# Patient Record
Sex: FEMALE | Race: WHITE | NOT HISPANIC OR LATINO
[De-identification: names, ages, dates, MRNs, and addresses within clinical notes are randomized per-mention and may not be internally consistent; named-entity substitution may affect disease eponyms.]

---

## 2023-01-03 ENCOUNTER — APPOINTMENT (OUTPATIENT)
Dept: ENDOCRINOLOGY | Facility: CLINIC | Age: 33
End: 2023-01-03
Payer: MEDICARE

## 2023-01-03 VITALS
SYSTOLIC BLOOD PRESSURE: 110 MMHG | HEIGHT: 58 IN | BODY MASS INDEX: 30.86 KG/M2 | DIASTOLIC BLOOD PRESSURE: 78 MMHG | WEIGHT: 147 LBS

## 2023-01-03 DIAGNOSIS — Z78.9 OTHER SPECIFIED HEALTH STATUS: ICD-10-CM

## 2023-01-03 DIAGNOSIS — Z82.3 FAMILY HISTORY OF STROKE: ICD-10-CM

## 2023-01-03 DIAGNOSIS — Z82.49 FAMILY HISTORY OF ISCHEMIC HEART DISEASE AND OTHER DISEASES OF THE CIRCULATORY SYSTEM: ICD-10-CM

## 2023-01-03 PROBLEM — Z00.00 ENCOUNTER FOR PREVENTIVE HEALTH EXAMINATION: Status: ACTIVE | Noted: 2023-01-03

## 2023-01-03 PROCEDURE — 99203 OFFICE O/P NEW LOW 30 MIN: CPT

## 2023-01-03 RX ORDER — AMOXICILLIN 500 MG/1
500 TABLET, FILM COATED ORAL
Qty: 21 | Refills: 0 | Status: ACTIVE | COMMUNITY
Start: 2022-09-12

## 2023-01-03 RX ORDER — MONTELUKAST 10 MG/1
10 TABLET, FILM COATED ORAL
Qty: 90 | Refills: 0 | Status: ACTIVE | COMMUNITY
Start: 2022-03-04

## 2023-01-03 RX ORDER — FLUTICASONE PROPIONATE 50 UG/1
50 SPRAY, METERED NASAL
Qty: 48 | Refills: 0 | Status: ACTIVE | COMMUNITY
Start: 2022-09-06

## 2023-01-03 RX ORDER — IVERMECTIN 10 MG/G
1 CREAM TOPICAL
Qty: 45 | Refills: 0 | Status: DISCONTINUED | COMMUNITY
Start: 2022-11-08

## 2023-01-03 RX ORDER — KETOCONAZOLE 20.5 MG/ML
2 SHAMPOO, SUSPENSION TOPICAL
Qty: 120 | Refills: 0 | Status: ACTIVE | COMMUNITY
Start: 2022-11-08

## 2023-01-03 RX ORDER — FAMOTIDINE 20 MG/1
20 TABLET, FILM COATED ORAL
Qty: 180 | Refills: 0 | Status: ACTIVE | COMMUNITY
Start: 2022-04-11

## 2023-01-03 RX ORDER — IBUPROFEN 800 MG/1
800 TABLET, FILM COATED ORAL
Qty: 30 | Refills: 0 | Status: ACTIVE | COMMUNITY
Start: 2022-09-12

## 2023-01-03 RX ORDER — TRIAMCINOLONE ACETONIDE 1 MG/G
0.1 CREAM TOPICAL
Qty: 80 | Refills: 0 | Status: ACTIVE | COMMUNITY
Start: 2022-11-08

## 2023-01-03 RX ORDER — NORGESTIMATE AND ETHINYL ESTRADIOL 0.25-0.035
0.25-35 KIT ORAL
Qty: 84 | Refills: 0 | Status: ACTIVE | COMMUNITY
Start: 2022-06-28

## 2023-01-03 RX ORDER — CLOBETASOL PROPIONATE 0.5 MG/ML
0.05 SOLUTION TOPICAL
Qty: 50 | Refills: 0 | Status: ACTIVE | COMMUNITY
Start: 2022-11-08

## 2023-01-05 LAB
ALBUMIN SERPL ELPH-MCNC: 4.3 G/DL
ALP BLD-CCNC: 158 U/L
ALT SERPL-CCNC: 61 U/L
ANION GAP SERPL CALC-SCNC: 20 MMOL/L
AST SERPL-CCNC: 32 U/L
BILIRUB SERPL-MCNC: 0.2 MG/DL
BUN SERPL-MCNC: 17 MG/DL
CALCIUM SERPL-MCNC: 9.5 MG/DL
CHLORIDE SERPL-SCNC: 101 MMOL/L
CHOLEST SERPL-MCNC: 135 MG/DL
CO2 SERPL-SCNC: 20 MMOL/L
CREAT SERPL-MCNC: 0.73 MG/DL
EGFR: 111 ML/MIN/1.73M2
FSH SERPL-MCNC: 3.8 IU/L
GLUCOSE SERPL-MCNC: 106 MG/DL
HDLC SERPL-MCNC: 36 MG/DL
LDLC SERPL CALC-MCNC: 57 MG/DL
LH SERPL-ACNC: 2.2 IU/L
NONHDLC SERPL-MCNC: 99 MG/DL
POTASSIUM SERPL-SCNC: 4.5 MMOL/L
PROT SERPL-MCNC: 7.6 G/DL
SODIUM SERPL-SCNC: 141 MMOL/L
T3 SERPL-MCNC: 241 NG/DL
T4 FREE SERPL-MCNC: 1.4 NG/DL
THYROPEROXIDASE AB SERPL IA-ACNC: 72.4 IU/ML
TRIGL SERPL-MCNC: 212 MG/DL
TSH SERPL-ACNC: 0.01 UIU/ML

## 2023-01-16 LAB
TSH RECEPTOR AB: 4.08 IU/L
TSI ACT/NOR SER: 4.11 IU/L

## 2023-01-16 NOTE — ASSESSMENT
[FreeTextEntry1] : Patient presents for evaluation of low TSH level and weight gain\par Reviewed outside blood work from October 2022, noted suppressed TSH and normal free T4 level of 1.1, however no thyroid antibodies were obtained at that time.\par Discussed various pathophysiology and etiologies of low TSH level with patient today.\par She is not currently on any thyroid medication at this time\par Clinically euthyroid, however notes weight gain\par Bloodwork was collected in office today, will f/u results accordingly.\par Also advised to obtain thyroid sonogram to evaluate for nodules\par \par Answered all questions today; patient verbalized understanding of the above\par RTC in 3 months\par

## 2023-01-16 NOTE — HISTORY OF PRESENT ILLNESS
[FreeTextEntry1] : DONALD MERINO  is a 32 year old female with past medical history of ANDRZEJ on CPAP, who presents today for management of low TSH level and weight gain\par \par Patient notes she has seen endocrinologist in past, was never on thyroid medication.  She notes she had recent low TSH level upon review of blood work.  She notes she has been struggling with weight gain and has just started seeing a nutritionist again. She gained 20 pounds in past 1.5 years. \par Typical diet:\par Breakfast: no coffee, egg whites, sugar, protein smoothies\par Lunch: healthy meal protein, egg salad, and tuna fish (chicken teriyaki,  rice, sesame chicken)\par Dinner: Chicken cutlets, pasta with meatballs.\par Dessert: \par Snacking: cookies, chips, cravings, LMP 12/15/22\par She notes she recently recovered from a stomach virus, denies h/o COVID \par She exercises and uses treadmill 30-45min 5 days per week, also does lyndsay, and has a .\par \par Family Hx: Mom- prediabetes, hypothyroidism. Daughter- gestational diabetes.

## 2023-01-16 NOTE — PHYSICAL EXAM
[Alert] : alert [No Acute Distress] : no acute distress [Well Developed] : well developed [Normal Voice/Communication] : normal voice communication [Normal Sclera/Conjunctiva] : normal sclera/conjunctiva [EOMI] : extra ocular movement intact [No Proptosis] : no proptosis [No Lid Lag] : no lid lag [No LAD] : no lymphadenopathy [Supple] : the neck was supple [Thyroid Not Enlarged] : the thyroid was not enlarged [No Thyroid Nodules] : no palpable thyroid nodules [No Respiratory Distress] : no respiratory distress [No Accessory Muscle Use] : no accessory muscle use [Normal Rate and Effort] : normal respiratory rate and effort [Normal Bowel Sounds] : normal bowel sounds [Not Tender] : non-tender [Soft] : abdomen soft [No Stigmata of Cushings Syndrome] : no stigmata of Cushings Syndrome [Normal Gait] : normal gait [No Involuntary Movements] : no involuntary movements were seen [No Tremors] : no tremors [Oriented x3] : oriented to person, place, and time [Normal Insight/Judgement] : insight and judgment were intact

## 2023-04-11 ENCOUNTER — APPOINTMENT (OUTPATIENT)
Dept: ENDOCRINOLOGY | Facility: CLINIC | Age: 33
End: 2023-04-11
Payer: MEDICARE

## 2023-04-11 VITALS
OXYGEN SATURATION: 94 % | BODY MASS INDEX: 31.7 KG/M2 | HEIGHT: 58 IN | WEIGHT: 151 LBS | HEART RATE: 81 BPM | SYSTOLIC BLOOD PRESSURE: 109 MMHG | DIASTOLIC BLOOD PRESSURE: 70 MMHG

## 2023-04-11 DIAGNOSIS — R79.89 OTHER SPECIFIED ABNORMAL FINDINGS OF BLOOD CHEMISTRY: ICD-10-CM

## 2023-04-11 PROCEDURE — 99213 OFFICE O/P EST LOW 20 MIN: CPT | Mod: 25

## 2023-04-11 PROCEDURE — 36415 COLL VENOUS BLD VENIPUNCTURE: CPT

## 2023-04-12 LAB
ALBUMIN SERPL ELPH-MCNC: 4.4 G/DL
ALP BLD-CCNC: 123 U/L
ALT SERPL-CCNC: 38 U/L
ANION GAP SERPL CALC-SCNC: 14 MMOL/L
AST SERPL-CCNC: 21 U/L
BASOPHILS # BLD AUTO: 0.07 K/UL
BASOPHILS NFR BLD AUTO: 0.8 %
BILIRUB SERPL-MCNC: 0.2 MG/DL
BUN SERPL-MCNC: 18 MG/DL
CALCIUM SERPL-MCNC: 9.3 MG/DL
CHLORIDE SERPL-SCNC: 101 MMOL/L
CHOLEST SERPL-MCNC: 174 MG/DL
CO2 SERPL-SCNC: 25 MMOL/L
CREAT SERPL-MCNC: 0.88 MG/DL
EGFR: 88 ML/MIN/1.73M2
EOSINOPHIL # BLD AUTO: 0.17 K/UL
EOSINOPHIL NFR BLD AUTO: 2 %
GLUCOSE SERPL-MCNC: 95 MG/DL
HCT VFR BLD CALC: 48.1 %
HDLC SERPL-MCNC: 45 MG/DL
HGB BLD-MCNC: 15.3 G/DL
IMM GRANULOCYTES NFR BLD AUTO: 0.3 %
LDLC SERPL CALC-MCNC: 96 MG/DL
LYMPHOCYTES # BLD AUTO: 1.84 K/UL
LYMPHOCYTES NFR BLD AUTO: 21.4 %
MAN DIFF?: NORMAL
MCHC RBC-ENTMCNC: 31.8 GM/DL
MCHC RBC-ENTMCNC: 32.6 PG
MCV RBC AUTO: 102.6 FL
MONOCYTES # BLD AUTO: 0.67 K/UL
MONOCYTES NFR BLD AUTO: 7.8 %
NEUTROPHILS # BLD AUTO: 5.82 K/UL
NEUTROPHILS NFR BLD AUTO: 67.7 %
NONHDLC SERPL-MCNC: 129 MG/DL
PLATELET # BLD AUTO: 357 K/UL
POTASSIUM SERPL-SCNC: 4.2 MMOL/L
PROT SERPL-MCNC: 7.4 G/DL
RBC # BLD: 4.69 M/UL
RBC # FLD: 13.9 %
SODIUM SERPL-SCNC: 140 MMOL/L
T3 SERPL-MCNC: 153 NG/DL
T4 FREE SERPL-MCNC: 0.9 NG/DL
TRIGL SERPL-MCNC: 166 MG/DL
TSH SERPL-ACNC: 3.04 UIU/ML
WBC # FLD AUTO: 8.6 K/UL

## 2023-04-18 LAB
TSH RECEPTOR AB: 1.1 IU/L
TSI ACT/NOR SER: 2.14 IU/L

## 2023-04-25 NOTE — ASSESSMENT
[Methimazole Therapy/PTU Therapy] : Risks and benefits of methimazole therapy/PTU therapy were discussed with the patient, including rash, liver dysfunction, and agranulocytosis. Patient was instructed to call the office for flu-like symptoms (e.g. fever and sore-throat) [FreeTextEntry1] : Graves' hyperthyroidism\par Currently on methimazole 5 mg daily, denies any history of fever, sore throat or rash while on this medication..\par Clinically euthyroid, however notes weight has since improved\par Bloodwork was collected in office today, will f/u results accordingly.\par Reminded patient to obtain thyroid sonogram to evaluate for nodules\par \par Answered all questions today; patient verbalized understanding of the above\par RTC in 3 months\par

## 2023-04-25 NOTE — HISTORY OF PRESENT ILLNESS
[FreeTextEntry1] : DONALD MERINO  is a 32 year old female with past medical history of hypothyroidism, ANDRZEJ on CPAP, who presents today for management of hypothyroidism\par \par At last endocrinology visit in January 2023 patient was diagnosed with Graves' hyperthyroidism and started on methimazole 5 mg daily.  Today she notes some improvement since starting on medication.  She also notes her weight has also improved.\par She exercises and uses treadmill 30-45min 5 days per week, also does lyndsay, and has a .\par

## 2023-06-15 ENCOUNTER — RX RENEWAL (OUTPATIENT)
Age: 33
End: 2023-06-15

## 2023-07-13 ENCOUNTER — RX RENEWAL (OUTPATIENT)
Age: 33
End: 2023-07-13

## 2023-10-24 ENCOUNTER — APPOINTMENT (OUTPATIENT)
Dept: ENDOCRINOLOGY | Facility: CLINIC | Age: 33
End: 2023-10-24
Payer: MEDICARE

## 2023-10-24 VITALS
HEART RATE: 76 BPM | SYSTOLIC BLOOD PRESSURE: 98 MMHG | OXYGEN SATURATION: 94 % | BODY MASS INDEX: 33.17 KG/M2 | HEIGHT: 58 IN | DIASTOLIC BLOOD PRESSURE: 61 MMHG | WEIGHT: 158 LBS

## 2023-10-24 DIAGNOSIS — R63.5 ABNORMAL WEIGHT GAIN: ICD-10-CM

## 2023-10-24 PROCEDURE — 99213 OFFICE O/P EST LOW 20 MIN: CPT | Mod: 25

## 2023-10-24 PROCEDURE — 36415 COLL VENOUS BLD VENIPUNCTURE: CPT

## 2023-10-28 ENCOUNTER — TRANSCRIPTION ENCOUNTER (OUTPATIENT)
Age: 33
End: 2023-10-28

## 2023-11-13 LAB
ALBUMIN SERPL ELPH-MCNC: 4.4 G/DL
ALP BLD-CCNC: 129 U/L
ALT SERPL-CCNC: 39 U/L
ANION GAP SERPL CALC-SCNC: 12 MMOL/L
AST SERPL-CCNC: 25 U/L
BILIRUB SERPL-MCNC: 0.3 MG/DL
BUN SERPL-MCNC: 16 MG/DL
CALCIUM SERPL-MCNC: 9.3 MG/DL
CHLORIDE SERPL-SCNC: 100 MMOL/L
CHOLEST SERPL-MCNC: 156 MG/DL
CO2 SERPL-SCNC: 27 MMOL/L
CREAT SERPL-MCNC: 0.69 MG/DL
EGFR: 116 ML/MIN/1.73M2
ESTIMATED AVERAGE GLUCOSE: 111 MG/DL
GLUCOSE SERPL-MCNC: 80 MG/DL
HBA1C MFR BLD HPLC: 5.5 %
HDLC SERPL-MCNC: 43 MG/DL
LDLC SERPL CALC-MCNC: 79 MG/DL
NONHDLC SERPL-MCNC: 113 MG/DL
POTASSIUM SERPL-SCNC: 4.7 MMOL/L
PROT SERPL-MCNC: 7.7 G/DL
SODIUM SERPL-SCNC: 140 MMOL/L
T3 SERPL-MCNC: 147 NG/DL
T4 FREE SERPL-MCNC: 0.9 NG/DL
TRIGL SERPL-MCNC: 198 MG/DL
TSH RECEPTOR AB: 2.25 IU/L
TSH SERPL-ACNC: 6.22 UIU/ML
TSI ACT/NOR SER: 1.43 IU/L

## 2024-01-09 ENCOUNTER — RX RENEWAL (OUTPATIENT)
Age: 34
End: 2024-01-09

## 2024-01-30 ENCOUNTER — APPOINTMENT (OUTPATIENT)
Dept: ENDOCRINOLOGY | Facility: CLINIC | Age: 34
End: 2024-01-30

## 2024-03-04 ENCOUNTER — RX RENEWAL (OUTPATIENT)
Age: 34
End: 2024-03-04

## 2024-04-26 ENCOUNTER — APPOINTMENT (OUTPATIENT)
Dept: ENDOCRINOLOGY | Facility: CLINIC | Age: 34
End: 2024-04-26
Payer: MEDICARE

## 2024-04-26 VITALS
DIASTOLIC BLOOD PRESSURE: 66 MMHG | OXYGEN SATURATION: 97 % | SYSTOLIC BLOOD PRESSURE: 99 MMHG | WEIGHT: 158.25 LBS | HEART RATE: 74 BPM | HEIGHT: 58 IN | BODY MASS INDEX: 33.22 KG/M2

## 2024-04-26 DIAGNOSIS — E66.9 OBESITY, UNSPECIFIED: ICD-10-CM

## 2024-04-26 DIAGNOSIS — E05.00 THYROTOXICOSIS WITH DIFFUSE GOITER W/OUT THYROTOXIC CRISIS OR STORM: ICD-10-CM

## 2024-04-26 PROCEDURE — 99214 OFFICE O/P EST MOD 30 MIN: CPT | Mod: 25

## 2024-04-26 PROCEDURE — 36415 COLL VENOUS BLD VENIPUNCTURE: CPT

## 2024-04-29 LAB
ALBUMIN SERPL ELPH-MCNC: 4.1 G/DL
ALP BLD-CCNC: 166 U/L
ALT SERPL-CCNC: 54 U/L
ANION GAP SERPL CALC-SCNC: 12 MMOL/L
AST SERPL-CCNC: 22 U/L
BILIRUB SERPL-MCNC: 0.2 MG/DL
BUN SERPL-MCNC: 13 MG/DL
CALCIUM SERPL-MCNC: 9 MG/DL
CHLORIDE SERPL-SCNC: 103 MMOL/L
CO2 SERPL-SCNC: 25 MMOL/L
CREAT SERPL-MCNC: 0.79 MG/DL
EGFR: 100 ML/MIN/1.73M2
ESTIMATED AVERAGE GLUCOSE: 111 MG/DL
GLUCOSE SERPL-MCNC: 102 MG/DL
HBA1C MFR BLD HPLC: 5.5 %
POTASSIUM SERPL-SCNC: 4.4 MMOL/L
PROT SERPL-MCNC: 7.3 G/DL
SODIUM SERPL-SCNC: 141 MMOL/L
T3 SERPL-MCNC: 174 NG/DL
T4 FREE SERPL-MCNC: 1.1 NG/DL
TSH SERPL-ACNC: 4.28 UIU/ML

## 2024-05-01 LAB
TSH RECEPTOR AB: 1.24 IU/L
TSI ACT/NOR SER: 1.13 IU/L

## 2024-05-13 RX ORDER — TIRZEPATIDE 2.5 MG/.5ML
2.5 INJECTION, SOLUTION SUBCUTANEOUS
Qty: 1 | Refills: 1 | Status: ACTIVE | COMMUNITY
Start: 2024-04-29 | End: 1900-01-01

## 2024-05-22 NOTE — ASSESSMENT
[Methimazole Therapy/PTU Therapy] : Risks and benefits of methimazole therapy/PTU therapy were discussed with the patient, including rash, liver dysfunction, and agranulocytosis. Patient was instructed to call the office for flu-like symptoms (e.g. fever and sore-throat) [FreeTextEntry1] : Graves' hyperthyroidism Currently on methimazole 2.5 mg daily, denies any history of fever, sore throat or rash while on this medication.. Clinically euthyroid, however notes weight gain. No known thyroid eye disease Bloodwork was collected in office today, will f/u results accordingly.   No history of thyroid nodules  Obesity BMI is 33 Discussed importance of lifestyle modifications including diet including following a carbohydrate balanced diet, healthy plating, portion control, and minimize snacking. and the importance of incorporating protein in meals. We also discussed appropriate alternative food choices including sugar alternatives. We discussed the importance of incorporating exercise, to increase physical activity at least 30min/day  Will also do diabetes screening Discussed indications, benefits and potential side effects with patient today, patient agreed to try. Start Zepbound 2.5mg weekly, pt to let us know if not covered by insurance  Answered all questions today; patient and sister verbalized understanding of the above RTO in 3 months

## 2024-05-22 NOTE — HISTORY OF PRESENT ILLNESS
[FreeTextEntry1] : DONALD MERINO  is a 33 year old female with past medical history of hypothyroidism, ANDRZEJ on CPAP, who presents today for management of hypothyroidism  At last endocrinology visit in April 2023 patient was diagnosed with Graves' hyperthyroidism and continued on methimazole 5 mg daily.  She is here with her sister today.  Since last visit she has no new complaints but notes she recently became engaged.  Of note sister and father concerned that she has gained some weight and would like to do diabetes screening.

## 2024-06-05 ENCOUNTER — RX RENEWAL (OUTPATIENT)
Age: 34
End: 2024-06-05

## 2024-06-05 RX ORDER — METHIMAZOLE 5 MG/1
5 TABLET ORAL
Qty: 30 | Refills: 2 | Status: ACTIVE | COMMUNITY
Start: 2023-01-26 | End: 1900-01-01

## 2024-09-06 ENCOUNTER — LABORATORY RESULT (OUTPATIENT)
Age: 34
End: 2024-09-06

## 2024-09-06 ENCOUNTER — APPOINTMENT (OUTPATIENT)
Dept: ENDOCRINOLOGY | Facility: CLINIC | Age: 34
End: 2024-09-06

## 2024-09-06 VITALS
HEIGHT: 58 IN | OXYGEN SATURATION: 97 % | SYSTOLIC BLOOD PRESSURE: 111 MMHG | DIASTOLIC BLOOD PRESSURE: 77 MMHG | WEIGHT: 164.06 LBS | HEART RATE: 80 BPM | BODY MASS INDEX: 34.44 KG/M2

## 2024-09-06 DIAGNOSIS — E05.00 THYROTOXICOSIS WITH DIFFUSE GOITER W/OUT THYROTOXIC CRISIS OR STORM: ICD-10-CM

## 2024-09-06 DIAGNOSIS — E66.9 OBESITY, UNSPECIFIED: ICD-10-CM

## 2024-09-06 PROCEDURE — 99214 OFFICE O/P EST MOD 30 MIN: CPT | Mod: 25

## 2024-09-06 PROCEDURE — 36415 COLL VENOUS BLD VENIPUNCTURE: CPT

## 2024-09-06 NOTE — ASSESSMENT
[Methimazole Therapy/PTU Therapy] : Risks and benefits of methimazole therapy/PTU therapy were discussed with the patient, including rash, liver dysfunction, and agranulocytosis. Patient was instructed to call the office for flu-like symptoms (e.g. fever and sore-throat) [FreeTextEntry1] : Graves' hyperthyroidism Currently on methimazole 2.5 mg daily, denies any history of fever, sore throat or rash while on this medication.. Clinically euthyroid, however notes weight gain. No known thyroid eye disease Bloodwork was collected in office today, will f/u results accordingly.   No history of thyroid nodules  Obesity BMI is 34 Discussed importance of lifestyle modifications including diet including following a carbohydrate balanced diet, healthy plating, portion control, and minimize snacking. and the importance of incorporating protein in meals. We also discussed appropriate alternative food choices including sugar alternatives. We discussed the importance of incorporating exercise, to increase physical activity at least 30min/day  Will also do diabetes screening Discussed indications, benefits and potential side effects with patient today, patient agreed to try. Start Zepbound 2.5mg weekly, pt to let us know if not covered by insurance, not covered s a  Answered all questions today; patient and sister verbalized understanding of the above RTO in 3 months

## 2024-09-06 NOTE — HISTORY OF PRESENT ILLNESS
[FreeTextEntry1] : DONALD MERINO  is a 34 year old female with past medical history of hyperthyroidism, ANDRZEJ on CPAP, who presents today for management of hyperthyroidism.   At last endocrinology visit in April 2024 patient was diagnosed with Graves' hyperthyroidism and continued on methimazole 5 mg daily.  She is here with her sister today.  Since last visit she has no new complaints but notes she recently became engaged.  Of note sister and father concerned that she has gained some weight and would like to do diabetes screening.

## 2024-09-12 LAB
ALBUMIN SERPL ELPH-MCNC: 4.5 G/DL
ALP BLD-CCNC: 109 U/L
ALT SERPL-CCNC: 27 U/L
ANION GAP SERPL CALC-SCNC: 24 MMOL/L
AST SERPL-CCNC: 23 U/L
BASOPHILS # BLD AUTO: 0.08 K/UL
BASOPHILS NFR BLD AUTO: 0.7 %
BILIRUB SERPL-MCNC: <0.2 MG/DL
BUN SERPL-MCNC: 20 MG/DL
CALCIUM SERPL-MCNC: 9.8 MG/DL
CHLORIDE SERPL-SCNC: 99 MMOL/L
CO2 SERPL-SCNC: 15 MMOL/L
CREAT SERPL-MCNC: 0.78 MG/DL
EGFR: 101 ML/MIN/1.73M2
EOSINOPHIL # BLD AUTO: 0.2 K/UL
EOSINOPHIL NFR BLD AUTO: 1.9 %
ESTIMATED AVERAGE GLUCOSE: 114 MG/DL
FSH SERPL-MCNC: 2.8 IU/L
GLUCOSE SERPL-MCNC: 107 MG/DL
HBA1C MFR BLD HPLC: 5.6 %
HCT VFR BLD CALC: 47.2 %
HGB BLD-MCNC: 14.5 G/DL
IMM GRANULOCYTES NFR BLD AUTO: 0.4 %
LH SERPL-ACNC: 3.4 IU/L
LYMPHOCYTES # BLD AUTO: 1.76 K/UL
LYMPHOCYTES NFR BLD AUTO: 16.3 %
MAN DIFF?: NORMAL
MCHC RBC-ENTMCNC: 30.7 GM/DL
MCHC RBC-ENTMCNC: 33.4 PG
MCV RBC AUTO: 108.8 FL
MONOCYTES # BLD AUTO: 0.67 K/UL
MONOCYTES NFR BLD AUTO: 6.2 %
NEUTROPHILS # BLD AUTO: 8.02 K/UL
NEUTROPHILS NFR BLD AUTO: 74.5 %
PLATELET # BLD AUTO: 372 K/UL
POTASSIUM SERPL-SCNC: 4.1 MMOL/L
PROT SERPL-MCNC: 8 G/DL
RBC # BLD: 4.34 M/UL
RBC # FLD: 14.3 %
SODIUM SERPL-SCNC: 138 MMOL/L
T3 SERPL-MCNC: 195 NG/DL
T4 FREE SERPL-MCNC: 1 NG/DL
TSH RECEPTOR AB: 1.41 IU/L
TSH SERPL-ACNC: 6.48 UIU/ML
WBC # FLD AUTO: 10.77 K/UL

## 2024-09-14 LAB
TESTOST FREE SERPL-MCNC: 0.9 PG/ML
TESTOST SERPL-MCNC: 7.4 NG/DL

## 2024-12-17 ENCOUNTER — APPOINTMENT (OUTPATIENT)
Dept: ENDOCRINOLOGY | Facility: CLINIC | Age: 34
End: 2024-12-17
Payer: MEDICARE

## 2024-12-17 VITALS
OXYGEN SATURATION: 96 % | HEART RATE: 85 BPM | WEIGHT: 162 LBS | SYSTOLIC BLOOD PRESSURE: 113 MMHG | DIASTOLIC BLOOD PRESSURE: 72 MMHG | HEIGHT: 58 IN | BODY MASS INDEX: 34 KG/M2

## 2024-12-17 DIAGNOSIS — E05.00 THYROTOXICOSIS WITH DIFFUSE GOITER W/OUT THYROTOXIC CRISIS OR STORM: ICD-10-CM

## 2024-12-17 DIAGNOSIS — E66.9 OBESITY, UNSPECIFIED: ICD-10-CM

## 2024-12-17 PROCEDURE — 99214 OFFICE O/P EST MOD 30 MIN: CPT

## 2024-12-17 RX ORDER — SEMAGLUTIDE 0.25 MG/.5ML
0.25 INJECTION, SOLUTION SUBCUTANEOUS
Qty: 1 | Refills: 3 | Status: ACTIVE | COMMUNITY
Start: 2024-12-17 | End: 1900-01-01

## 2024-12-18 LAB
ALBUMIN SERPL ELPH-MCNC: 4.1 G/DL
ALP BLD-CCNC: 119 U/L
ALT SERPL-CCNC: 34 U/L
ANION GAP SERPL CALC-SCNC: 16 MMOL/L
AST SERPL-CCNC: 18 U/L
BILIRUB SERPL-MCNC: 0.3 MG/DL
BUN SERPL-MCNC: 18 MG/DL
CALCIUM SERPL-MCNC: 9.5 MG/DL
CHLORIDE SERPL-SCNC: 101 MMOL/L
CHOLEST SERPL-MCNC: 164 MG/DL
CO2 SERPL-SCNC: 24 MMOL/L
CREAT SERPL-MCNC: 0.72 MG/DL
EGFR: 111 ML/MIN/1.73M2
ESTIMATED AVERAGE GLUCOSE: 111 MG/DL
GLUCOSE SERPL-MCNC: 94 MG/DL
HBA1C MFR BLD HPLC: 5.5 %
HDLC SERPL-MCNC: 43 MG/DL
INSULIN P FAST SERPL-ACNC: 77.2 UU/ML
LDLC SERPL CALC-MCNC: 92 MG/DL
NONHDLC SERPL-MCNC: 121 MG/DL
POTASSIUM SERPL-SCNC: 4.4 MMOL/L
PROT SERPL-MCNC: 7.8 G/DL
SODIUM SERPL-SCNC: 141 MMOL/L
T3 SERPL-MCNC: 159 NG/DL
T4 FREE SERPL-MCNC: 1 NG/DL
TRIGL SERPL-MCNC: 164 MG/DL
TSH SERPL-ACNC: 2.51 UIU/ML

## 2025-03-11 ENCOUNTER — APPOINTMENT (OUTPATIENT)
Dept: ENDOCRINOLOGY | Facility: CLINIC | Age: 35
End: 2025-03-11
Payer: MEDICARE

## 2025-03-11 VITALS
BODY MASS INDEX: 33.8 KG/M2 | HEIGHT: 58 IN | DIASTOLIC BLOOD PRESSURE: 71 MMHG | SYSTOLIC BLOOD PRESSURE: 103 MMHG | OXYGEN SATURATION: 94 % | HEART RATE: 83 BPM | WEIGHT: 161 LBS

## 2025-03-11 DIAGNOSIS — E66.9 OBESITY, UNSPECIFIED: ICD-10-CM

## 2025-03-11 DIAGNOSIS — G47.33 OBSTRUCTIVE SLEEP APNEA (ADULT) (PEDIATRIC): ICD-10-CM

## 2025-03-11 DIAGNOSIS — E05.00 THYROTOXICOSIS WITH DIFFUSE GOITER W/OUT THYROTOXIC CRISIS OR STORM: ICD-10-CM

## 2025-03-11 PROCEDURE — 99214 OFFICE O/P EST MOD 30 MIN: CPT

## 2025-03-11 RX ORDER — TIRZEPATIDE 2.5 MG/.5ML
2.5 INJECTION, SOLUTION SUBCUTANEOUS
Qty: 4 | Refills: 3 | Status: ACTIVE | COMMUNITY
Start: 2025-03-11 | End: 1900-01-01

## 2025-03-12 LAB
T3 SERPL-MCNC: 169 NG/DL
T4 FREE SERPL-MCNC: 0.9 NG/DL
TSH RECEPTOR AB: 2.87 IU/L
TSH SERPL-ACNC: 2.76 UIU/ML

## 2025-03-14 LAB — TSI ACT/NOR SER: 1.41 IU/L
